# Patient Record
(demographics unavailable — no encounter records)

---

## 2024-11-14 NOTE — REASON FOR VISIT
[Initial Evaluation] : an initial evaluation [FreeTextEntry1] : pt here for assessment of b/l leg varicose veins, left > right, with left medial ankle venous dermatitis, no ulcers with cvi sx of ache heaviness cramps throbbing - has + family hx of venous ds - b/l vle with rvt shows b/l gsv reflux with branch varicosities - no dv no svt - vle also shows left calf intramuscular masss 2.3 x 2.7 cm - no arterial or venous flow in it - Pt denies any pain in that area or hx trauma - will return for left gsv rfa 12/03 and rt gsv rfa 12/12/24.

## 2024-11-14 NOTE — PHYSICAL EXAM
[2+] : left 2+ [Ankle Swelling (On Exam)] : present [Ankle Swelling Bilaterally] : bilaterally  [Varicose Veins Of Lower Extremities] : bilaterally [] : bilaterally [Ankle Swelling On The Left] : moderate [No Rash or Lesion] : No rash or lesion [Purpura] : no purpura  [Petechiae] : no petechiae [Skin Ulcer] : no ulcer [Skin Induration] : no induration [Alert] : alert [Oriented to Person] : oriented to person [Oriented to Place] : oriented to place [Oriented to Time] : oriented to time [Calm] : calm [de-identified] : wdwn nad [FreeTextEntry1] : b/l leg scattered varicose veins, left medial ankle venous dermatitis, no venous ulcers [de-identified] : wnl [de-identified] : as above

## 2024-11-14 NOTE — ASSESSMENT
[FreeTextEntry1] : Vascular surgeon discussed with the patient: Varicose veins are enlarged, twisted veins. Varicose veins are caused by increased blood pressure in the veins.  The blood moves towards the heart by 1-way valves in the veins. When the valves become weakened or damaged, blood can collect in the veins. This causes the veins to become enlarged. Sitting or standing for long periods can cause blood to pool in the leg veins, increasing the pressure within the veins. The veins can stretch from the increased pressure. This may weaken the walls of the veins and damage the valves. Varicose veins may be more common in some families (inherited).  Factors that may increase pressure include:  obesity, older age, being female, pregnancy, taking oral contraceptive pills or hormone replacement, being inactive, and/or smoking.  The most common symptoms of varicose veins are sensations in the legs, such as a heavy feeling, burning, and/or aching. However, each individual may experience symptoms differently.  Other symptoms may include:  color changes in the skin, sores on the legs, or rash.  Severe varicose veins may eventually produce long-term mild swelling that can result in more serious skin and tissue problems, such as ulcers and non-healing sores. Varicose veins are diagnosed by a complete medical history, physical examination, and diagnostic studies for varicose veins including duplex ultrasound and color-flow imaging.   Medical treatment for varicose veins include:  compression stockings, sclerotherapy, RFA, endovenous laser ablation and/or surgical treatment microphlebectomy.    left gsv rfa 12/03 rt gsv rfa 12/12 [Other: _____] : [unfilled]

## 2025-04-02 NOTE — PROCEDURE
[FreeTextEntry1] : left gsv rfa [FreeTextEntry3] : Left lower extremity varicose veins with inflammation, fatigue, heaviness, pain, swelling, cramping heaviness, and dermatitis.  Pt was diagnosed with Chronic Venous insufficiency (CVI), chronic venous hypertension and venous reflux which requires thermal ablation with endovenous radiofrequency to treat the truncal/axial vein reflux (Radiofrequency Ablation, RFA).  Ultrasound examination demonstrated reflux in the left gsv vein with reflux into the patient's varicosities.  A trial of compression stockings, exercise, elevation, and pain medication was attempted without relief and as such, this patient was offered RFA as definitive treatment.  After explaining risks and benefits, informed consent was obtained and the patient was brought to the treatment room.  The patient was escorted to the procedure room and placed in the supine position on the examination table.   The left leg was prepped and draped in the usual sterile fashion and time-out was called.  A sonographic probe was placed into a sterile sheath and the lower extremity was imaged. A suitable access site in the superficial truncal vein to be treated was identified using sonographic guidance and marked at the skin level. 1 mL of 1% lidocaine without epinephrine was administered subcutaneously using a 25G needle.  The RF catheter, dilator and introducer were flushed with saline and wiped down and placed on the sterile field.  Using standard Seldinger technique, access to the saphenous vein was obtained with the needle and corresponding guidewire.  The needle was removed and the 7Fr introducer sheath with dilator was advanced over the wire into the vein. The guide wire and dilator were removed and the RFA fiber catheter was placed into the vein through the introducer sheath.  The position of the RFA  2 cm below the Sapheno-femoral Junction was verified using ultrasound guidance.       Local tumescent anesthesia, under ultrasound guidance, was administered circumferentially around the entire length of vein in the perivenous compartment to ensure a complete "halo" of anesthetic around the vein.  Tumescent anesthesia:  250 mL 0.9% Sodium Chloride was poured into a sterile blue basin and 50 cc of sterile injectable lidocaine 1% (without Epinephrine) was added using a 20 mL syringe.  The RFA location was again confirmed to be appropriate and the RFA energy was applied by pressing the hand button.  The proximal 7 cm was treated for 2 cycles at 20 seconds. The RFA fiber was then withdrawn in sequential 6.5 cm segments and each section was treated with one cycle of 20 seconds thermal energy application. The system was monitored to ensure that the vein wall temperature was within 120 +/- 5 degrees C and the generator output was well below its maximum starting power.   The operation of the RFA was ceased when the fiber end was at its most distal marker as indicated by the distal tip nava lines.  The sheath and RFA fiber catheter were removed together and manual pressure was applied at access site for hemostasis.  Post procedure, the vein was imaged and showed successful closure along the entire treated length with a patent proximal most 2-3 cm and sapheno-femoral junction.  Dry bandaid was applied to the access site and a compression stocking 20 - 30 mm Hg was applied to the treated extremity.  The patient tolerated the procedure well with no complications.  Vital signs stable, patient was monitored throughout procedure.    Refer to procedure sheet for procedure start and end time, # of total cycles,  total treated vein length treated,  and total treatment duration time documentation.   Post-Op Instructions:  The following instructions were reviewed with the patient and a print out of the instructions provided to patient at time of visit:   1)  Compression stocking to be worn 24 hours per day x 7 days.  2)  Do not get the stocking wet for the first 3 days.  3)  Ambulation immediately following procedure and as much as possible for the first 7 days.  4)  Contact the office if any questions or concerns. 5)  Patient must follow-up within the first week for post procedure reflux study performed in office.  Reviewed with the patient the follow up visit is to ensure that there are no complications such as a deep vein thrombosis (DVT) or endovenous heat-induced thrombus (EHIT).  Patient acknowledged understanding of post-op instructions and was provided with print out of instructions at time of visit.    Reviewed with the patient post-procedure RFA  instructions, provided patient with printed copy of instructions along with Dr. Arellano's cell phone number:  [phone], and advised patient to call for any questions or concerns prior to follow up visit.  All questions answered.

## 2025-04-02 NOTE — REASON FOR VISIT
[Procedure: _________] : a [unfilled] procedure visit [FreeTextEntry1] : Patient has severe reflux left gsv and here for left gsv RFA procedure.  JULY MCKNIGHT proceeded with left gsv RFA with no complications.  JULY MCKNIGHT will follow up in one week for routine visit with VLE.

## 2025-04-09 NOTE — PHYSICAL EXAM
[2+] : left 2+ [Ankle Swelling (On Exam)] : present [Ankle Swelling Bilaterally] : bilaterally  [Varicose Veins Of Lower Extremities] : bilaterally [] : bilaterally [Ankle Swelling On The Left] : moderate [No Rash or Lesion] : No rash or lesion [Purpura] : no purpura  [Petechiae] : no petechiae [Skin Ulcer] : no ulcer [Skin Induration] : no induration [Alert] : alert [Oriented to Person] : oriented to person [Oriented to Place] : oriented to place [Oriented to Time] : oriented to time [Calm] : calm [de-identified] : wdwn nad [FreeTextEntry1] : b/l leg scattered varicose veins, left medial ankle venous dermatitis, no venous ulcers; large varicose veins left leg [de-identified] : wnl [de-identified] : as above

## 2025-04-09 NOTE — REASON FOR VISIT
[Follow-Up: _____] : a [unfilled] follow-up visit [FreeTextEntry1] : He is s/p left gsv RFA.  Patient is here today for routine follow up with VLE. Left leg ultrasound confirms no dvt, no HIIT, no truncal reflux and left gsv is closed as desired. Pt to return for left leg stab phlebectomy 4/16 to remove large branch varicose veins.

## 2025-04-09 NOTE — DATA REVIEWED
[FreeTextEntry1] : i reviewed left gsv vle with rvt s/p recent rfa - left gsv closed, no dvt, no svt no ehit left sf junction patent and compressible

## 2025-04-16 NOTE — REASON FOR VISIT
[Procedure: _________] : a [unfilled] procedure visit [FreeTextEntry1] : Patient with painful dilated branch varicose veins left leg.  He is here for left leg microphlebectomy.  JULY MCKNIGHT proceeded with left leg microphlebectomy without complications.  He will follow up in 2 weeks for routine visit and suture removal.

## 2025-04-16 NOTE — PROCEDURE
[FreeTextEntry1] : left calf stab phlebectomy [FreeTextEntry3] : Left lower extremity varicose veins with pain, swelling, and venous insufficiency.   Varicose veins of the left lower extremity with inflammation.  Venous reflux/insufficiency, leg pain, limb swelling and cramps.  Chronic venous hypertension.  Mr. JULY MCKNIGHT is a 54 year old M with a history of left lower extremity varicose veins previously seen in the office.  Ultrasound examination demonstrated multiple varicosities in the patients left leg with venous reflux.  A trial of compression stockings, exercise, elevation, and pain medication was attempted without relief and as such, this patient was offered microphlebectomy therapy.  After explaining risks and benefits, informed consent was obtained and the patient agreed to proceed.    The patient was escorted to the procedure room.  The varicose veins for treatment were marked out and the patient agreed with the markings.  Patient was placed in the  supine  position on the examination table.  The left leg was prepped and draped in the usual sterile fashion and time-out was called.    Local anesthesia was administered subcutaneously along the marked varicosities for treatment.  Anesthesia:  Local anesthesia 1% lidocaine without epinephrine.  Anesthesia is drawn up using 10 mL sterile syringe and 18G blunt fill needle, 10 mL sterile injectable lidocaine 1% (without Epinephrine) is drawn up.  18G blunt fill needle with 5 micron filter is removed and 25G 11/2 inch needle placed on syringe with medication for injection.    Total dose of lidocaine without epinephrine:  15 cc   A total of   ~  ~  micro incisions were made over the varicosities that were previously marked out and the veins were grasped using a combination of mosquitoes and forceps.  The veins were removed.  At completion, hemostasis was ensured with digital pressure at the stab sites.  Once complete, the incisions were appropriately closed with 4-0 Monocryl suture and steri-strips.  The left leg was appropriately dressed with 4 inch Kerlix and 4 inch latex-free Ace bandage.  The patient tolerated the procedure well with no complications.  Vital signs stable, patient was monitored throughout.  Patient was escorted to the changing room.  Specimen sent to pathology for evaluation.  Post-Op Instructions:  The following instructions were reviewed with the patient and a hard copy of the instructions provided:   1)  Surgical dressing to remain on for 72 hours.   2)  Do not get the surgical dressing wet for the first 3 days.  3)  Ambulation immediately following procedure and as much as possible.  4)  Contact the office if any questions or concerns; follow-up 2 weeks post-op for evaluation and suture removal.  Patient acknowledged understanding of post-op instructions and was provided with print out of instructions at time of visit.  Provided patient with Dr. Brown cell number:  [phone].  All questions answered.

## 2025-04-30 NOTE — REASON FOR VISIT
[Follow-Up: _____] : a [unfilled] follow-up visit [FreeTextEntry1] : status post left leg microphlebectomy.  Patient is here for routine 2 week post-operative follow-up and suture removal.    Sutures removed, steri-strips placed.   Incisions healing well without signs or symptoms of infection.  Instructed patient to protect incisions from the sun with use of sunscreen.  Pt will call if interested in b/l leg sclerotherapy.

## 2025-04-30 NOTE — PHYSICAL EXAM
[2+] : left 2+ [Ankle Swelling (On Exam)] : present [Ankle Swelling Bilaterally] : bilaterally  [Varicose Veins Of Lower Extremities] : bilaterally [] : bilaterally [Ankle Swelling On The Left] : moderate [No Rash or Lesion] : No rash or lesion [Purpura] : no purpura  [Petechiae] : no petechiae [Skin Ulcer] : no ulcer [Skin Induration] : no induration [Alert] : alert [Oriented to Person] : oriented to person [Oriented to Place] : oriented to place [Oriented to Time] : oriented to time [Calm] : calm [de-identified] : wdwn nad [FreeTextEntry1] : b/l leg scattered varicose veins, excellent healing left leg stab phlebectomy incisions [de-identified] : wnl [de-identified] : as above